# Patient Record
Sex: FEMALE | URBAN - METROPOLITAN AREA
[De-identification: names, ages, dates, MRNs, and addresses within clinical notes are randomized per-mention and may not be internally consistent; named-entity substitution may affect disease eponyms.]

---

## 2024-08-27 ENCOUNTER — TELEPHONE (OUTPATIENT)
Dept: DERMATOLOGY | Facility: CLINIC | Age: 29
End: 2024-08-27

## 2024-08-27 NOTE — TELEPHONE ENCOUNTER
[facility] Call Center    Phone Message    May a detailed message be left on voicemail: no     Reason for Call: Other: Patient called and stated that she needs some laser hair removal before a surgery she has next year. However writer tried pulling on the schedule and nothing comes up. Please call patient to discuss and schedule.     Action Taken: Message routed to:  [facility] ([facility]): Dermatology    Travel Screening: Not Applicable

## 2024-08-27 NOTE — TELEPHONE ENCOUNTER
Pt called she is having a vaginoplasty done in March 2025. Writer explained that we have a lengthy wait list and are looking at about a 1-1.5 year wait currently. Pt offered to be added to wait list but declined.      Jeri Mcleod RN on 8/27/2024 at 11:39 AM